# Patient Record
(demographics unavailable — no encounter records)

---

## 2025-04-10 NOTE — DISCUSSION/SUMMARY
[de-identified] : WBAT in supportive footwear, air heel recommended.   The patient was prescribed celebrex 200mg daily as needed for pain.  They were explained the potential risks of GI pain/bleeding as well as hypertension.  They were told not to take any other nsaids while taking this medication.  Ice to affected area.  The role of PT discussed.  Physical therapy was prescribed for 2-3 times per week for four weeks.

## 2025-04-10 NOTE — PHYSICAL EXAM
[Left] : left foot and ankle [Right] : right foot and ankle [NL (40)] : plantar flexion 40 degrees [NL 30)] : inversion 30 degrees [NL (20)] : eversion 20 degrees [5___] : eversion 5[unfilled]/5 [2+] : posterior tibialis pulse: 2+ [Normal] : saphenous nerve sensation normal [Weight -] : weightbearing [Bilateral] : calcaneus bilaterally [] : non-antalgic [FreeTextEntry9] :  AP, mortise and lateral xrays of the ankle were taken and reviewed today. Bilateral achilles insertion spurs, calcification within proximal plantar fascia on the right.

## 2025-04-10 NOTE — HISTORY OF PRESENT ILLNESS
[Gradual] : gradual [9] : 9 [Radiating] : radiating [Walking] : walking [de-identified] : Pt. is a 71 year old male who presents for evaluation of his feet/ankles. Denies trauma. Symptoms for over a year. He presents today WB in sneakers. He has tried Mobic and Diclofenac. Reports start up pain. H/O bilateral plantar fascia release about 20 years ago.  [] : no [FreeTextEntry1] : heels/ankles [FreeTextEntry5] : bilat achilles pain for awhile, no injury [FreeTextEntry7] : up the legs/achilles  [FreeTextEntry9] : compression for support [de-identified] :  while in [place]

## 2025-04-10 NOTE — REVIEW OF SYSTEMS
[Joint Pain] : joint pain [Negative] : Heme/Lymph [Muscle Weakness] : muscle weakness [de-identified] : balance is off

## 2025-05-13 NOTE — HISTORY OF PRESENT ILLNESS
[FreeTextEntry1] : Follow DM, hyperlipidemia and thyroid nodule (s/p benign FNA of left upper pole 1.3 cm nodule in 6/2014). Last visit was found to have elevated LFTs, but did not follow up with GI as recommended.  He has not stopped his metformin or his statin.     Quality:  type 2 DM Severity:  moderate Duration of diabetes:  since 2012 Associated Complications/ Symptoms:  no known microvascular complications.    Modifying Factors:  Better with medication  Current Diabetic Medication Regimen: MFN  mg BID Mounjaro 5 mg qweek Intolerant to Ozempic due to nausea/ bloating   SMBG:  not testing routinely at home

## 2025-05-13 NOTE — ASSESSMENT
[FreeTextEntry1] : 71 year old male with type 2 DM, MNG s/p benign FNA of dominant left upper pole 1.1 cm nodule in 2016, HTN and hyperlipidemia here for follow up. His diabetes seems well controlled and stable, but need labs to confirm.     1. Type 2 DM- Continue Mounjaro.  Check labs now.  If LFTs remain elevated, would D/C Metformin.   2. MNG- stable nodules on US in 2023.  Check thyroid US now.   3. HTN- continue losartan 4. Hyperlipidemia-   May need to hold statin therapy if LFTs remain elevated.    5.  Elevated LFTs-   will check LFTs and Hep panel now.  If still elevated, will refer to GI.    Follow up in 4 months.

## 2025-05-13 NOTE — DATA REVIEWED
[FreeTextEntry1] : Thyroid US 5/18/2023: Stable nodules  Thyroid US 1/27/2022: right mid pole 0.8 x 0.7 x 0.7 cm hypoechoic nodule (stable). right lower pole 0.6 x 0.6 x 0.5 cm heterogeneous nodule (not previously described). Right lower pole 0.4 x 0.4 x 0.5 cm hypoechoic nodule (not previously described). left upper pole 1.1 x 1 x 0.9 cm hypoechoic nodule with macrocalcification (stable, s/p benign FNA in 2014) Left lower pole 0.6 x 0.4 x 0.4 cm heterogeneous nodule (not previously described) Left lower pole 0.9 x 0.6 x 0.5 cm hypoechoic nodule (overall size appears stable, but appears more solid).   Thyroid US 8/22/2019: right mid pole 0.8 x 0.5 x 0.7 cm isoechoic nodule (relatively stable) left upper pole 1.1 x 1 x 0.8 cm isoechoic heterogeneous nodule (stable s/p benign FNA in 2014) left mid to lower pole 0.9 x 0.6 x 0.7 cm hypoechoic nodule (relatively stable)   Thyroid US 9/15/2016: stable right mid pole 0.7 cm nodule, stable left upper pole 1.1 cm nodule (s/p benign FNA 2014), stable left mid to lower pole 0.7 cm hypoechoic nodule

## 2025-05-20 NOTE — PROCEDURE
[Other: ___] : [unfilled]. All measurements will be reported as longitudinal x valeria-posterior x transverse. [Report dated ___] : Report dated [unfilled] [Multinodular Goiter] : multinodular goiter [FreeTextEntry1] : 4.5 x 1.6 x 1.8 [FreeTextEntry5] : 4.3 x 1.4 x 1.4 [FreeTextEntry2] : 0.4 [FreeTextEntry6] : Mildly heterogeneous gland.  [de-identified] : Multiple nodules are visualized as follows: Right lobe: Upper mid pole 1 x 0.8 x 0.9 cm isoechoic heterogeneous nodule (slightly larger) lower pole 0.8 x 0.6 x 0.8 cm isoechoic heterogeneous nodule (stable) lower pole 0.6 x 0.5 x 0.6 cm hypoechoic nodule (stable)  Left lobe: upper pole 1.1 x 1.1 x 0.9 cm heterogeneous nodule (stable) lower pole 0.5 x 0.4 x 0.4 cm heterogeneous nodule (stable) lower pole 0.8 x 0.5 x 0.6 cm hypoechoic nodule (stable)

## 2025-05-20 NOTE — IMPRESSION
[FreeTextEntry1] : Mildly heterogeneous multinodular gland with several nodules, most of which appear relatively stable in size.

## 2025-05-22 NOTE — HISTORY OF PRESENT ILLNESS
[Gradual] : gradual [9] : 9 [Radiating] : radiating [Walking] : walking [Shooting] : shooting [Nothing helps with pain getting better] : Nothing helps with pain getting better [de-identified] : Patient returns for his bilateral achilles tendonitis and plantar fasciits. Symptoms for over a year. He presents today WB in sneakers. He has tried Mobic and Diclofenac.  H/O bilateral plantar fascia release about 20 years ago. Since last visit he has been taking Celebrex.  He did not find the Airheels to be helpful. He did not go to PT.  He states his pain is the same. [] : no [FreeTextEntry1] : heels/ankles [FreeTextEntry5] : bilat achilles pain for awhile, no injury [FreeTextEntry7] : up the legs/achilles  [FreeTextEntry9] : compression for support

## 2025-05-22 NOTE — PHYSICAL EXAM
[Left] : left foot and ankle [NL (20)] : eversion 20 degrees [Right] : right foot and ankle [NL (40)] : plantar flexion 40 degrees [NL 30)] : inversion 30 degrees [5___] : eversion 5[unfilled]/5 [2+] : posterior tibialis pulse: 2+ [Normal] : saphenous nerve sensation normal [Weight -] : weightbearing [Bilateral] : calcaneus bilaterally [FreeTextEntry9] :  AP, mortise and lateral xrays of the ankle were taken and reviewed today. Bilateral achilles insertion spurs, calcification within proximal plantar fascia on the right.  [] : non-antalgic [de-identified] : eversion 15 degrees [TWNoteComboBox7] : dorsiflexion 15 degrees

## 2025-05-22 NOTE — DISCUSSION/SUMMARY
[de-identified] : The plantar fascia pain is the more severe at this time. He was offered bilateral plantar fascia injections and would like to proceed. PT was again strongly recommended. He can continue with the Celebrex.  The role of PT discussed.  Physical therapy was prescribed for 2-3 times per week for four weeks.

## 2025-05-22 NOTE — PROCEDURE
[FreeTextEntry3] : The patient was offered a steroid injection to suppress acute inflammation.  The indication for the injection is persistent pain. The risks benefits, and alternatives have been discussed, and verbal consent was obtained.   The risks, benefits and contents of the injection have been discussed.  Risks include but are not limited to allergic reaction, flare reaction, permanent white skin discoloration at the injection site and infection.  The patient understands the risks and agrees to having the injection.  All questions have been answered.   An injection of the right and left plantar fascia insertions was performed. The indication for this procedure was pain and inflammation. The site was prepped with alcohol and sterile technique used. With a 25 gauge needle, an injection of 1cc Lidocaine 1% , 1cc of Ropivacaine 0.5% , and 1cc of 80mg Methylprednisolone (Depomedrol) was performed. Patient tolerated procedure well. Patient was advised to call if redness, pain, or fever occur, apply ice for 15 minutes out of every hour for the next 12-24 hours as tolerated and patient was advised to rest the joint(s) for 3 days.   Ultrasound guidance was indicated for this patient due to inflammation . Plantar fascia thickness measured 0.36 cm on the right and 0.43 cm on the left.  All ultrasound images have been permanently captured and stored accordingly in our picture archiving and communication system. Visualization of the needle and placement of injection was performed without complication.   Patient has been advised to ice the plantar heel for multiple 20 minute intervals throughout the day. No sports, running, jumping or exercise activities should be done until re-evaluated. Only light stretching exercises are permitted.

## 2025-05-22 NOTE — REVIEW OF SYSTEMS
[Joint Pain] : joint pain [Muscle Weakness] : muscle weakness [Negative] : Heme/Lymph [de-identified] : balance is off

## 2025-07-18 NOTE — DISCUSSION/SUMMARY
[FreeTextEntry1] : I will evaluate his symptoms described in HPI with echo, ETT and ZIO recording Patient on Mounjaro.  Losing weight.  Also taking metformin.  Followed by endocrine. Hypercholesteremia.  LDL 50 in May 2025.  LFT were normal.  The dose of simvastatin has been reduced. Hypertension.  Well-controlled with losartan.  Continue. EKG has sinus rhythm, anterior fascicular block.  No ST-T abnormality. We will follow him after above tests.  Thank you for this referral and allowing me to participate in the care of this patient.  If I can be of any further help or  if you have any questions, please do not hesitate to contact me  Sincerely, Tyler Gavin MD, FACC, MARIA M

## 2025-07-18 NOTE — HISTORY OF PRESENT ILLNESS
[FreeTextEntry1] : Edouard is a 71-year-old male with history of diabetes, overweight, hypertension, hypercholesteremia.  No past history of major cardiovascular events. For the past several months, he has been feeling dizzy.  On detailed questioning, it seems that he has slight imbalance, especially if he is turning.  He does not have clear-cut vertigo.  No lightheadedness or syncope.  No palpitations.  He has been less active and also developing dementia and forgetfulness. Denies exertional chest pain or shortness of breath.  No pedal edema, PND or orthopnea.  No palpitations.